# Patient Record
(demographics unavailable — no encounter records)

---

## 2024-10-24 NOTE — DISCUSSION/SUMMARY
[Clean] : was clean [Dry] : was dry [Intact] : was intact [None] : had no drainage [Normal Skin] : normal appearance [Doing Well] : is doing well [Excellent Pain Control] : has excellent pain control [No Sign of Infection] : is showing no signs of infection [1] : 1 [Erythema] : was not erythematous [Ecchymosis] : was not ecchymotic [Seroma] : had no seroma [Firm] : soft [Tender] : nontender [Rebound] : no rebound tenderness [Guarding] : no guarding [Incisional Hernia] : no incisional hernia [Mass] : no palpable mass [Cervical Abnormality] : normal cervix [External Genitalia Abnormal] : normal external genitalia [Vaginal Exam Abnormal] : normal vaginal exam [de-identified] : normal [de-identified] : regular diet. continue to maintain pelvic rest x 4 more weeks. refrain from heavy lifting and strenuous exercise x 4 more weeks. [FreeTextEntry1] : PATHOLOGY:  Peritoneal cytology negative.  1. Endometrium; curettings: - Endometrial polyp.  - Background showing secretory endometrium.  2. Fallopian tube and ovary, left; salpingo-oophorectomy: - Compatible with endometriotic cyst. - Fallopian tube with endometriosis.  3. Fallopian tube and ovary, right; salpingo-oophorectomy: - Compatible with corpus luteum cyst. - Fallopian tube with endometriosis.

## 2024-10-24 NOTE — REASON FOR VISIT
[Post Op] : post op visit [de-identified] : 10/11/24 [de-identified] : robotic BSO/HSC/D&C/polypectomy/extensive RICARDO [de-identified] : She reports that she has had a very smooth recovery with no issues. She denies abnl vaginal bleeding, pelvic or abdominal pain or urinary or bowel complaints. No longer requiring pain medication. Returning to usual activities and maintaining pelvic rest.

## 2024-10-24 NOTE — ASSESSMENT
[FreeTextEntry1] : 43y/o s/p robotic BSO/HSC/D&C/polypectomy, extensive lysis of adhesions, for benign bilateral ovarian endometriomas and endometrial polyp, healing well.  PLAN: Final pathology results were reviewed in detail with the patient and she was given a copy for her records. Instructions were reviewed.  She will f/u with Dr. Herman for continued [facility] cancer followup. She was advised that she should follow up with Dr. Tijerina for menopause management and for routine gynecologic care. She is advised to rpomptly report any PMB and to have sono followup while she is on tamoxifen; sonos can be managed by Dr. Tijerina. She is aware that I remain available to her for any issues or questions.  All questions were answered to her apparent satisfaction.

## 2024-10-28 NOTE — HISTORY OF PRESENT ILLNESS
[Disease: _____________________] : Disease: [unfilled] [T: ___] : T[unfilled] [N: ___] : N[unfilled] [M: ___] : M[unfilled] [AJCC Stage: ____] : AJCC Stage: [unfilled] [de-identified] : The patient presented in 2018, at age 38, with an abnormal screening mammogram, no lump felt but fhx of great maternal aunt had breast cancer.  The patient had her baseline screening mammogram performed on 10/22/2018 which demonstrated pleomorphic calcifications in the right breast. Diagnostic right mammogram and right breast ultrasound were done on 10/23/2018. Mammogram demonstrated calcifications occupying most of the upper inner quadrant of the right breast and also in the nipple. A 5-6 mm suspicious hypoechoic mass was seen at 12:00 on ultrasound.  Stereotactic biopsy of 4 areas the right breast and ultrasound-guided biopsy of the mass were performed on 10/23/2018. The stereotactic biopsies demonstrated atypical ductal hyperplasia. The ultrasound-guided biopsy at 12:00 was positive for moderately differentiated infiltrating ductal carcinoma measuring at least 4 mm. DCIS could not be ruled out in the background. Infiltrating carcinoma was ER positive (90-95%), [place] positive (90-95%), HER-2/toby negative (1+) and Ki-67 less than 10%. Breast MRI on 10/25/2018 revealed enhancement at the 12:00 position in the right breast at the site of known cancer and suspicion for multicentric extensive intraductal carcinoma in the right breast. The left breast and axilla were negative.  Dr. Kesha Vaz performed bilateral mastectomies and bilateral sentinel lymph node biopsies at [facility] on 2018. This was followed by immediate direct to implant reconstruction performed by Dr. Rc Webb.  Pathology from the right breast demonstrated infiltrating ductal carcinoma with micropapillary features, [place] score 6/9 measuring 6 mm. 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 Surgical margins were negative. There were 2 negative sentinel lymph nodes. Pathology from the left breast demonstrated benign fibrocystic changes. There  was one left sentinel lymph node, negative for metastatic disease. The patient had an uneventful postoperative course.    Oncotype DX recurrence score was 15.    Tamoxifen start 2018.  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  Family history is positive for a maternal great aunt who had unilateral breast cancer in her late 60s/early 70s. There is no family history of cancer of the ovary, uterus, or prostate. There is no family history of colorectal neoplasia. The patient's father, who was a smoker,  of lung cancer at age 52. There is no other family history of cancer. The patient has had benign uterine polyps. The patient is of [place] ethnic background. [de-identified] : Infiltrating ductal carcinoma, Page score 6/9, ER positive, [place] positive, HER-2/toby negative, Oncotype DX recurrence score 15 [FreeTextEntry1] : Tamoxifen start 12/2018 [de-identified] : 42/f Infiltrating ductal carcinoma right breast, [place] score 6/9, ER +, [place] +, HER-2/toby negative, Oncotype DX recurrence score 15, T1b N0 M0, Stage IA (AJCC 8 edition), Status post bilateral mastectomies/sentinel lymph node biopsies/direct to implant reconstructions. On tamoxifen since 12/2018. FIRST VISIT WITH ME 12/7/22, TX OF CARE FROM DR COPELAND.  6/24/24 She is tolerating tamoxifen well with no hot flashes. Good compliance. She denies mood changes, wt gain, vaginal dryness, SOB, chest pain, leg swelling or clotting issues. Her energy and appetite is good, wt stable. 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 She underwent IVF. She walks 10 k steps, plays tennis. SHe is doing a rowing machine. Wt stable. DEXA scan 5/14/24: Osteopenia in left femoral neck. Patient is taking Vitamin D 14,000 IU once a week. Planning to take Calcium supplement as well. LMP - 6/2/24., Dr Tijerina , seeing once a year, TVS 1/29/24 which was normal. Rec to use condoms Breast imaging: She gets sono q6m (BL mastectomies). Last US breast in 12/24- BIRADS 2,  f/b Dr Elena Gregory . Requesting to continue sono q6m  Opthal: once a year, Maria Isabel   10/2024  patient underwent BSO for GYN issues.  Path reviewed benign findings.  She is back on tamoxifen.  She is here today to discuss switching to AI.    She has several concerns about premature menopause secondary to  BSO.  We discussed cardiac health and bone health.  She has been followed by primary care for annual lipid profile and had a normal bone density.  We reviewed importance of healthy diet, exercise, weightbearing exercises and calcium intake.  Her concerns regarding premature menopause were addressed to her satisfaction.  Since BSO, she has noted some mood lability but otherwise no severe hot flashes, bone stiffness, fluid retention or weight gain.  She has no vaginal dryness or libido issues yet.  She started tamoxifen  12/2018.  In December 2024 she will finish 6 years.  We discussed 1 to 4 years of AI depending on tolerance.  She had relatively low risk tumor but wants to maximize treatment.  It would be reasonable to finish 10 years of therapy given young age of disease onset as long as she does not suffer from major toxicity. We briefly discussed AI side effects today.  I will see her back in 2 months and start AI.  Will order bone density at next visit.  In the meantime, she will call my office if she develops issues secondary to menopause

## 2024-10-28 NOTE — PHYSICAL EXAM
[Fully active, able to carry on all pre-disease performance without restriction] : Status 0 - Fully active, able to carry on all pre-disease performance without restriction [Normal] : affect appropriate [de-identified] : bilateral mastectomies with silicone implant reconstructions with nipple tattoos bilaterally.  No axillary LAD b/l.  no abnormal masses

## 2024-10-28 NOTE — ASSESSMENT
[FreeTextEntry1] : 42/f Infiltrating ductal carcinoma right breast, [place] score 6/9, ER +, DE +, HER-2/toby negative, Oncotype DX recurrence score 15, T1b N0 M0, Stage IA (AJCC 8 edition), Status post bilateral mastectomies/sentinel lymph node biopsies/direct to implant reconstructions. On tamoxifen since 12/2018. FIRST VISIT WITH ME 12/7/22, TX OF CARE FROM DR COPELAND. CHART REVIEWED  10/2024  patient underwent BSO for GYN issues.  Path reviewed benign findings.  She is back on tamoxifen.  She is here today to discuss switching to AI.    She has several concerns about premature menopause secondary to  BSO.  We discussed cardiac health and bone health.  She has been followed by primary care for annual lipid profile and had a normal bone density.  We reviewed importance of healthy diet, exercise, weightbearing exercises and calcium intake.  Her concerns regarding premature menopause were addressed to her satisfaction.    Since BSO, she has noted some mood lability but otherwise no severe hot flashes, bone stiffness, fluid retention or weight gain.  She has no vaginal dryness or libido issues yet.  She started tamoxifen  12/2018.  In December 2024 she will finish 6 years.  We discussed 1 to 4 years of AI depending on tolerance.  She had relatively low risk tumor but wants to maximize treatment.  It would be reasonable to finish 10 years of therapy given young age of disease onset as long as she does not suffer from major toxicity. We briefly discussed AI side effects today.  I will see her back in 2 months and start AI.  Will order bone density at next visit.  In the meantime, she will call my office if she develops issues secondary to menopause

## 2024-10-28 NOTE — HISTORY OF PRESENT ILLNESS
[Disease: _____________________] : Disease: [unfilled] [T: ___] : T[unfilled] [N: ___] : N[unfilled] [M: ___] : M[unfilled] [AJCC Stage: ____] : AJCC Stage: [unfilled] [de-identified] : The patient presented in 2018, at age 38, with an abnormal screening mammogram, no lump felt but fhx of great maternal aunt had breast cancer.  The patient had her baseline screening mammogram performed on 10/22/2018 which demonstrated pleomorphic calcifications in the right breast. Diagnostic right mammogram and right breast ultrasound were done on 10/23/2018. Mammogram demonstrated calcifications occupying most of the upper inner quadrant of the right breast and also in the nipple. A 5-6 mm suspicious hypoechoic mass was seen at 12:00 on ultrasound.  Stereotactic biopsy of 4 areas the right breast and ultrasound-guided biopsy of the mass were performed on 10/23/2018. The stereotactic biopsies demonstrated atypical ductal hyperplasia. The ultrasound-guided biopsy at 12:00 was positive for moderately differentiated infiltrating ductal carcinoma measuring at least 4 mm. DCIS could not be ruled out in the background. Infiltrating carcinoma was ER positive (90-95%), [place] positive (90-95%), HER-2/toby negative (1+) and Ki-67 less than 10%. Breast MRI on 10/25/2018 revealed enhancement at the 12:00 position in the right breast at the site of known cancer and suspicion for multicentric extensive intraductal carcinoma in the right breast. The left breast and axilla were negative.  Dr. Kesha Vaz performed bilateral mastectomies and bilateral sentinel lymph node biopsies at [facility] on 2018. This was followed by immediate direct to implant reconstruction performed by Dr. Rc Webb.  Pathology from the right breast demonstrated infiltrating ductal carcinoma with micropapillary features, [place] score 6/9 measuring 6 mm. 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 Surgical margins were negative. There were 2 negative sentinel lymph nodes. Pathology from the left breast demonstrated benign fibrocystic changes. There  was one left sentinel lymph node, negative for metastatic disease. The patient had an uneventful postoperative course.    Oncotype DX recurrence score was 15.    Tamoxifen start 2018.  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  Family history is positive for a maternal great aunt who had unilateral breast cancer in her late 60s/early 70s. There is no family history of cancer of the ovary, uterus, or prostate. There is no family history of colorectal neoplasia. The patient's father, who was a smoker,  of lung cancer at age 52. There is no other family history of cancer. The patient has had benign uterine polyps. The patient is of [place] ethnic background. [de-identified] : Infiltrating ductal carcinoma, Page score 6/9, ER positive, [place] positive, HER-2/toby negative, Oncotype DX recurrence score 15 [FreeTextEntry1] : Tamoxifen start 12/2018 [de-identified] : 42/f Infiltrating ductal carcinoma right breast, [place] score 6/9, ER +, [place] +, HER-2/toby negative, Oncotype DX recurrence score 15, T1b N0 M0, Stage IA (AJCC 8 edition), Status post bilateral mastectomies/sentinel lymph node biopsies/direct to implant reconstructions. On tamoxifen since 12/2018. FIRST VISIT WITH ME 12/7/22, TX OF CARE FROM DR COPELAND.  6/24/24 She is tolerating tamoxifen well with no hot flashes. Good compliance. She denies mood changes, wt gain, vaginal dryness, SOB, chest pain, leg swelling or clotting issues. Her energy and appetite is good, wt stable. 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 She underwent IVF. She walks 10 k steps, plays tennis. SHe is doing a rowing machine. Wt stable. DEXA scan 5/14/24: Osteopenia in left femoral neck. Patient is taking Vitamin D 14,000 IU once a week. Planning to take Calcium supplement as well. LMP - 6/2/24., Dr Tijerina , seeing once a year, TVS 1/29/24 which was normal. Rec to use condoms Breast imaging: She gets sono q6m (BL mastectomies). Last US breast in 12/24- BIRADS 2,  f/b Dr Elena Gregory . Requesting to continue sono q6m  Opthal: once a year, Maria Isabel   10/2024  patient underwent BSO for GYN issues.  Path reviewed benign findings.  She is back on tamoxifen.  She is here today to discuss switching to AI.    She has several concerns about premature menopause secondary to  BSO.  We discussed cardiac health and bone health.  She has been followed by primary care for annual lipid profile and had a normal bone density.  We reviewed importance of healthy diet, exercise, weightbearing exercises and calcium intake.  Her concerns regarding premature menopause were addressed to her satisfaction.  Since BSO, she has noted some mood lability but otherwise no severe hot flashes, bone stiffness, fluid retention or weight gain.  She has no vaginal dryness or libido issues yet.  She started tamoxifen  12/2018.  In December 2024 she will finish 6 years.  We discussed 1 to 4 years of AI depending on tolerance.  She had relatively low risk tumor but wants to maximize treatment.  It would be reasonable to finish 10 years of therapy given young age of disease onset as long as she does not suffer from major toxicity. We briefly discussed AI side effects today.  I will see her back in 2 months and start AI.  Will order bone density at next visit.  In the meantime, she will call my office if she develops issues secondary to menopause

## 2024-10-28 NOTE — ASSESSMENT
[FreeTextEntry1] : 42/f Infiltrating ductal carcinoma right breast, [place] score 6/9, ER +, DE +, HER-2/toby negative, Oncotype DX recurrence score 15, T1b N0 M0, Stage IA (AJCC 8 edition), Status post bilateral mastectomies/sentinel lymph node biopsies/direct to implant reconstructions. On tamoxifen since 12/2018. FIRST VISIT WITH ME 12/7/22, TX OF CARE FROM DR COPELAND. CHART REVIEWED  10/2024  patient underwent BSO for GYN issues.  Path reviewed benign findings.  She is back on tamoxifen.  She is here today to discuss switching to AI.    She has several concerns about premature menopause secondary to  BSO.  We discussed cardiac health and bone health.  She has been followed by primary care for annual lipid profile and had a normal bone density.  We reviewed importance of healthy diet, exercise, weightbearing exercises and calcium intake.  Her concerns regarding premature menopause were addressed to her satisfaction.    Since BSO, she has noted some mood lability but otherwise no severe hot flashes, bone stiffness, fluid retention or weight gain.  She has no vaginal dryness or libido issues yet.  She started tamoxifen  12/2018.  In December 2024 she will finish 6 years.  We discussed 1 to 4 years of AI depending on tolerance.  She had relatively low risk tumor but wants to maximize treatment.  It would be reasonable to finish 10 years of therapy given young age of disease onset as long as she does not suffer from major toxicity. We briefly discussed AI side effects today.  I will see her back in 2 months and start AI.  Will order bone density at next visit.  In the meantime, she will call my office if she develops issues secondary to menopause

## 2024-10-28 NOTE — REASON FOR VISIT
[Follow-Up Visit] : a follow-up [Other: _____] : [unfilled] [FreeTextEntry2] : Infiltrating ductal carcinoma right breast

## 2024-10-28 NOTE — PHYSICAL EXAM
Renal US and doppler scheduled 2/8.   [Fully active, able to carry on all pre-disease performance without restriction] : Status 0 - Fully active, able to carry on all pre-disease performance without restriction [Normal] : affect appropriate [de-identified] : bilateral mastectomies with silicone implant reconstructions with nipple tattoos bilaterally.  No axillary LAD b/l.  no abnormal masses

## 2024-12-18 NOTE — PHYSICAL EXAM
[Fully active, able to carry on all pre-disease performance without restriction] : Status 0 - Fully active, able to carry on all pre-disease performance without restriction [Normal] : affect appropriate [de-identified] : bilateral mastectomies with silicone implant reconstructions with nipple tattoos bilaterally.  No axillary LAD b/l.  no abnormal masses

## 2024-12-18 NOTE — PHYSICAL EXAM
[Fully active, able to carry on all pre-disease performance without restriction] : Status 0 - Fully active, able to carry on all pre-disease performance without restriction [Normal] : affect appropriate [de-identified] : bilateral mastectomies with silicone implant reconstructions with nipple tattoos bilaterally.  No axillary LAD b/l.  no abnormal masses

## 2024-12-18 NOTE — HISTORY OF PRESENT ILLNESS
[Disease: _____________________] : Disease: [unfilled] [T: ___] : T[unfilled] [N: ___] : N[unfilled] [M: ___] : M[unfilled] [AJCC Stage: ____] : AJCC Stage: [unfilled] [de-identified] : The patient presented in 2018, at age 38, with an abnormal screening mammogram, no lump felt but fhx of great maternal aunt had breast cancer.  The patient had her baseline screening mammogram performed on 10/22/2018 which demonstrated pleomorphic calcifications in the right breast. Diagnostic right mammogram and right breast ultrasound were done on 10/23/2018. Mammogram demonstrated calcifications occupying most of the upper inner quadrant of the right breast and also in the nipple. A 5-6 mm suspicious hypoechoic mass was seen at 12:00 on ultrasound.  Stereotactic biopsy of 4 areas the right breast and ultrasound-guided biopsy of the mass were performed on 10/23/2018. The stereotactic biopsies demonstrated atypical ductal hyperplasia. The ultrasound-guided biopsy at 12:00 was positive for moderately differentiated infiltrating ductal carcinoma measuring at least 4 mm. DCIS could not be ruled out in the background. Infiltrating carcinoma was ER positive (90-95%), [place] positive (90-95%), HER-2/toby negative (1+) and Ki-67 less than 10%. Breast MRI on 10/25/2018 revealed enhancement at the 12:00 position in the right breast at the site of known cancer and suspicion for multicentric extensive intraductal carcinoma in the right breast. The left breast and axilla were negative.  Dr. Kesha Vaz performed bilateral mastectomies and bilateral sentinel lymph node biopsies at [facility] on 2018. This was followed by immediate direct to implant reconstruction performed by Dr. Rc Webb.  Pathology from the right breast demonstrated infiltrating ductal carcinoma with micropapillary features, [place] score 6/9 measuring 6 mm. 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 Surgical margins were negative. There were 2 negative sentinel lymph nodes. Pathology from the left breast demonstrated benign fibrocystic changes. There  was one left sentinel lymph node, negative for metastatic disease. The patient had an uneventful postoperative course.    Oncotype DX recurrence score was 15.    Tamoxifen start 2018.  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  Family history is positive for a maternal great aunt who had unilateral breast cancer in her late 60s/early 70s. There is no family history of cancer of the ovary, uterus, or prostate. There is no family history of colorectal neoplasia. The patient's father, who was a smoker,  of lung cancer at age 52. There is no other family history of cancer. The patient has had benign uterine polyps. The patient is of [place] ethnic background.  10/2024  patient underwent BSO for GYN issues.  Path reviewed benign findings.  She is back on tamoxifen.  She is here today to discuss switching to AI.    She has several concerns about premature menopause secondary to  BSO.  We discussed cardiac health and bone health.  She has been followed by primary care for annual lipid profile and had a normal bone density.  We reviewed importance of healthy diet, exercise, weightbearing exercises and calcium intake.  Her concerns regarding premature menopause were addressed to her satisfaction.  Since BSO, she has noted some mood lability but otherwise no severe hot flashes, bone stiffness, fluid retention or weight gain.  She has no vaginal dryness or libido issues yet.  She started tamoxifen  2018.  In 2024 she will finish 6 years.  We discussed 1 to 4 years of AI depending on tolerance.  She had relatively low risk tumor but wants to maximize treatment.  It would be reasonable to finish 10 years of therapy given young age of disease onset as long as she does not suffer from major toxicity. We briefly discussed AI side effects today.  I will see her back in 2 months and start AI.  Will order bone density at next visit.  In the meantime, she will call my office if she develops issues secondary to menopause [de-identified] : Infiltrating ductal carcinoma, Page score 6/9, ER positive, [place] positive, HER-2/toby negative, Oncotype DX recurrence score 15 [FreeTextEntry1] : Tamoxifen start 12/2018 [de-identified] : 42/f Infiltrating ductal carcinoma right breast, [place] score 6/9, ER +, [place] +, HER-2/toby negative, Oncotype DX recurrence score 15, T1b N0 M0, Stage IA (AJCC 8 edition), Status post bilateral mastectomies/sentinel lymph node biopsies/direct to implant reconstructions. On tamoxifen since 12/2018. FIRST VISIT WITH ME 12/7/22, TX OF CARE FROM DR COPELAND.  BSO 10/2024.  AI started 11/2024 12/2024 She started anastrozole 11/2024.  Reports mild difficulty sleeping and fatigue.  No joint pain and hot flashes  She plans to start exercise  DEXA 5/2024 osteopenia. She takes ca+ vit D.  Weightbearing exercises discussed Breast imaging: She gets sono q6m (BL mastectomies). Last US breast in 12/24- BIRADS 2,  f/b Dr Elena Gregory . Requesting to continue sono q6m  Cholesterol monitoring and cardiology evaluation due to premature menopause discussed with her.  Referral made to see Dr. Hali Chua She started tamoxifen  12/2018.  In December 2024 she will finish 6 years.  We discussed 1 to 4 years of AI depending on tolerance.  She had relatively low risk tumor but wants to maximize treatment.  It would be reasonable to finish 10 years of therapy given young age of disease onset as long as she does not suffer from major toxicity.

## 2024-12-18 NOTE — REASON FOR VISIT
Addended by: MAURILIO ESPOSITO on: 11/13/2024 12:23 PM     Modules accepted: Orders     [Follow-Up Visit] : a follow-up [Other: _____] : [unfilled] [FreeTextEntry2] : Infiltrating ductal carcinoma right breast

## 2024-12-18 NOTE — ASSESSMENT
[FreeTextEntry1] : 42/f Infiltrating ductal carcinoma right breast, Page score 6/9, ER +, [place] +, HER-2/toby negative, Oncotype DX recurrence score 15, T1b N0 M0, Stage IA (AJCC 8 edition), Status post bilateral mastectomies/sentinel lymph node biopsies/direct to implant reconstructions. On tamoxifen since 12/2018. FIRST VISIT WITH ME 12/7/22, TX OF CARE FROM DR COPELAND. CHART REVIEWED. BSO 10/2024.  AI started 11/2024  1. Breast ca- Ms. DIMITRIS BRIGHT  is tolerating AI well, good compliance. She has mild side effects from the treatment. Continue treatment x 5-10 yrs.  Breast imaging: She gets sono q6m (BL mastectomies). Last US breast in 12/24- BIRADS 2,  f/b Dr Elena Gregory . Requesting to continue sono q6m  She started tamoxifen  12/2018.  In December 2024 she will finish 6 years.  We discussed 1 to 4 years of AI depending on tolerance.  She had relatively low risk tumor but wants to maximize treatment.  It would be reasonable to finish 10 years of therapy given young age of disease onset as long as she does not suffer from major toxicity. 2. Osteopenia: Concern for worsening bone density and fractures due to anastrozole. Rec to  continue calcium and vit D. DEXA 1-2 yrs.  3. High cholesterol/CAD risk factors: Concern for worsening cholesterol/CAD risk factors due to anastrozole. Lipid profile annually. Life style modifications d/w her. Cholesterol monitoring and cardiology evaluation due to premature menopause discussed with her.  Referral made to see Dr. Hali Chua 	 RTC 6 m

## 2024-12-18 NOTE — ASSESSMENT
[FreeTextEntry1] : 42/f Infiltrating ductal carcinoma right breast, Page score 6/9, ER +, [place] +, HER-2/toby negative, Oncotype DX recurrence score 15, T1b N0 M0, Stage IA (AJCC 8 edition), Status post bilateral mastectomies/sentinel lymph node biopsies/direct to implant reconstructions. On tamoxifen since 12/2018. FIRST VISIT WITH ME 12/7/22, TX OF CARE FROM DR COPELAND. CHART REVIEWED. BSO 10/2024.  AI started 11/2024  1. Breast ca- Ms. DIMITRIS BRIGHT  is tolerating AI well, good compliance. She has mild side effects from the treatment. Continue treatment x 5-10 yrs.  Breast imaging: She gets sono q6m (BL mastectomies). Last US breast in 12/24- BIRADS 2,  f/b Dr Elena Gregory . Requesting to continue sono q6m  She started tamoxifen  12/2018.  In December 2024 she will finish 6 years.  We discussed 1 to 4 years of AI depending on tolerance.  She had relatively low risk tumor but wants to maximize treatment.  It would be reasonable to finish 10 years of therapy given young age of disease onset as long as she does not suffer from major toxicity. 2. Osteopenia: Concern for worsening bone density and fractures due to anastrozole. Rec to  continue calcium and vit D. DEXA 1-2 yrs.  3. High cholesterol/CAD risk factors: Concern for worsening cholesterol/CAD risk factors due to anastrozole. Lipid profile annually. Life style modifications d/w her. Cholesterol monitoring and cardiology evaluation due to premature menopause discussed with her.  Referral made to see Dr. Hali Chua 	 RTC 6 m

## 2024-12-18 NOTE — HISTORY OF PRESENT ILLNESS
[Disease: _____________________] : Disease: [unfilled] [T: ___] : T[unfilled] [N: ___] : N[unfilled] [M: ___] : M[unfilled] [AJCC Stage: ____] : AJCC Stage: [unfilled] [de-identified] : The patient presented in 2018, at age 38, with an abnormal screening mammogram, no lump felt but fhx of great maternal aunt had breast cancer.  The patient had her baseline screening mammogram performed on 10/22/2018 which demonstrated pleomorphic calcifications in the right breast. Diagnostic right mammogram and right breast ultrasound were done on 10/23/2018. Mammogram demonstrated calcifications occupying most of the upper inner quadrant of the right breast and also in the nipple. A 5-6 mm suspicious hypoechoic mass was seen at 12:00 on ultrasound.  Stereotactic biopsy of 4 areas the right breast and ultrasound-guided biopsy of the mass were performed on 10/23/2018. The stereotactic biopsies demonstrated atypical ductal hyperplasia. The ultrasound-guided biopsy at 12:00 was positive for moderately differentiated infiltrating ductal carcinoma measuring at least 4 mm. DCIS could not be ruled out in the background. Infiltrating carcinoma was ER positive (90-95%), [place] positive (90-95%), HER-2/toby negative (1+) and Ki-67 less than 10%. Breast MRI on 10/25/2018 revealed enhancement at the 12:00 position in the right breast at the site of known cancer and suspicion for multicentric extensive intraductal carcinoma in the right breast. The left breast and axilla were negative.  Dr. Kesha Vaz performed bilateral mastectomies and bilateral sentinel lymph node biopsies at [facility] on 2018. This was followed by immediate direct to implant reconstruction performed by Dr. Rc Webb.  Pathology from the right breast demonstrated infiltrating ductal carcinoma with micropapillary features, [place] score 6/9 measuring 6 mm. 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 Surgical margins were negative. There were 2 negative sentinel lymph nodes. Pathology from the left breast demonstrated benign fibrocystic changes. There  was one left sentinel lymph node, negative for metastatic disease. The patient had an uneventful postoperative course.    Oncotype DX recurrence score was 15.    Tamoxifen start 2018.  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  Family history is positive for a maternal great aunt who had unilateral breast cancer in her late 60s/early 70s. There is no family history of cancer of the ovary, uterus, or prostate. There is no family history of colorectal neoplasia. The patient's father, who was a smoker,  of lung cancer at age 52. There is no other family history of cancer. The patient has had benign uterine polyps. The patient is of [place] ethnic background.  10/2024  patient underwent BSO for GYN issues.  Path reviewed benign findings.  She is back on tamoxifen.  She is here today to discuss switching to AI.    She has several concerns about premature menopause secondary to  BSO.  We discussed cardiac health and bone health.  She has been followed by primary care for annual lipid profile and had a normal bone density.  We reviewed importance of healthy diet, exercise, weightbearing exercises and calcium intake.  Her concerns regarding premature menopause were addressed to her satisfaction.  Since BSO, she has noted some mood lability but otherwise no severe hot flashes, bone stiffness, fluid retention or weight gain.  She has no vaginal dryness or libido issues yet.  She started tamoxifen  2018.  In 2024 she will finish 6 years.  We discussed 1 to 4 years of AI depending on tolerance.  She had relatively low risk tumor but wants to maximize treatment.  It would be reasonable to finish 10 years of therapy given young age of disease onset as long as she does not suffer from major toxicity. We briefly discussed AI side effects today.  I will see her back in 2 months and start AI.  Will order bone density at next visit.  In the meantime, she will call my office if she develops issues secondary to menopause [de-identified] : Infiltrating ductal carcinoma, Page score 6/9, ER positive, [place] positive, HER-2/toby negative, Oncotype DX recurrence score 15 [FreeTextEntry1] : Tamoxifen start 12/2018 [de-identified] : 42/f Infiltrating ductal carcinoma right breast, [place] score 6/9, ER +, [place] +, HER-2/toby negative, Oncotype DX recurrence score 15, T1b N0 M0, Stage IA (AJCC 8 edition), Status post bilateral mastectomies/sentinel lymph node biopsies/direct to implant reconstructions. On tamoxifen since 12/2018. FIRST VISIT WITH ME 12/7/22, TX OF CARE FROM DR COPELAND.  BSO 10/2024.  AI started 11/2024 12/2024 She started anastrozole 11/2024.  Reports mild difficulty sleeping and fatigue.  No joint pain and hot flashes  She plans to start exercise  DEXA 5/2024 osteopenia. She takes ca+ vit D.  Weightbearing exercises discussed Breast imaging: She gets sono q6m (BL mastectomies). Last US breast in 12/24- BIRADS 2,  f/b Dr Elena Gregory . Requesting to continue sono q6m  Cholesterol monitoring and cardiology evaluation due to premature menopause discussed with her.  Referral made to see Dr. Hali Chua She started tamoxifen  12/2018.  In December 2024 she will finish 6 years.  We discussed 1 to 4 years of AI depending on tolerance.  She had relatively low risk tumor but wants to maximize treatment.  It would be reasonable to finish 10 years of therapy given young age of disease onset as long as she does not suffer from major toxicity.

## 2025-06-09 NOTE — ASSESSMENT
[FreeTextEntry1] : 42/f Infiltrating ductal carcinoma right breast, Page score 6/9, ER +, [place] +, HER-2/toby negative, Oncotype DX recurrence score 15, T1b N0 M0, Stage IA (AJCC 8 edition), Status post bilateral mastectomies/sentinel lymph node biopsies/direct to implant reconstructions. On tamoxifen since 12/2018. FIRST VISIT WITH ME 12/7/22, TX OF CARE FROM DR COPELAND. CHART REVIEWED. BSO 10/2024.  AI started 11/2024  1. Breast ca- Ms. DIMITRIS BRIGHT  is tolerating AI well, good compliance. She has mild side effects from the treatment. Continue treatment x 5-10 yrs.  Breast imaging: She gets sono q6m (BL mastectomies). Last US breast in 12/24- BIRADS 2,  f/b Dr Elena Gregory . Requesting to continue sono q6m  She started tamoxifen  12/2018.  In December 2024 she will finish 6 years.  We discussed 1 to 4 years of AI depending on tolerance.  She had relatively low risk tumor but wants to maximize treatment.  It would be reasonable to finish 10 years of therapy given young age of disease onset as long as she does not suffer from major toxicity. She s highly motivated to do extended therapy  2. Osteopenia: Concern for worsening bone density and fractures due to anastrozole. Rec to  continue calcium and vit D. DEXA 1-2 yrs.  3. High cholesterol/CAD risk factors: Concern for worsening cholesterol/CAD risk factors due to anastrozole. Lipid profile annually. Life style modifications d/w her. Cholesterol monitoring and cardiology evaluation due to premature menopause discussed with her.  Referral made to see Dr. Hali Chua 	 RTC 6 m

## 2025-06-09 NOTE — HISTORY OF PRESENT ILLNESS
[Disease: _____________________] : Disease: [unfilled] [T: ___] : T[unfilled] [N: ___] : N[unfilled] [M: ___] : M[unfilled] [AJCC Stage: ____] : AJCC Stage: [unfilled] [de-identified] : The patient presented in 2018, at age 38, with an abnormal screening mammogram, no lump felt but fhx of great maternal aunt had breast cancer.  The patient had her baseline screening mammogram performed on 10/22/2018 which demonstrated pleomorphic calcifications in the right breast. Diagnostic right mammogram and right breast ultrasound were done on 10/23/2018. Mammogram demonstrated calcifications occupying most of the upper inner quadrant of the right breast and also in the nipple. A 5-6 mm suspicious hypoechoic mass was seen at 12:00 on ultrasound.  Stereotactic biopsy of 4 areas the right breast and ultrasound-guided biopsy of the mass were performed on 10/23/2018. The stereotactic biopsies demonstrated atypical ductal hyperplasia. The ultrasound-guided biopsy at 12:00 was positive for moderately differentiated infiltrating ductal carcinoma measuring at least 4 mm. DCIS could not be ruled out in the background. Infiltrating carcinoma was ER positive (90-95%), [place] positive (90-95%), HER-2/toby negative (1+) and Ki-67 less than 10%. Breast MRI on 10/25/2018 revealed enhancement at the 12:00 position in the right breast at the site of known cancer and suspicion for multicentric extensive intraductal carcinoma in the right breast. The left breast and axilla were negative.  Dr. Kesha Vaz performed bilateral mastectomies and bilateral sentinel lymph node biopsies at [facility] on 2018. This was followed by immediate direct to implant reconstruction performed by Dr. Rc Webb.  Pathology from the right breast demonstrated infiltrating ductal carcinoma with micropapillary features, [place] score 6/9 measuring 6 mm. 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 Surgical margins were negative. There were 2 negative sentinel lymph nodes. Pathology from the left breast demonstrated benign fibrocystic changes. There  was one left sentinel lymph node, negative for metastatic disease. The patient had an uneventful postoperative course.    Oncotype DX recurrence score was 15.    Tamoxifen start 2018.  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  Family history is positive for a maternal great aunt who had unilateral breast cancer in her late 60s/early 70s. There is no family history of cancer of the ovary, uterus, or prostate. There is no family history of colorectal neoplasia. The patient's father, who was a smoker,  of lung cancer at age 52. There is no other family history of cancer. The patient has had benign uterine polyps. The patient is of [place] ethnic background.  10/2024  patient underwent BSO for GYN issues.  Path reviewed benign findings.  She is back on tamoxifen.  She is here today to discuss switching to AI.    She has several concerns about premature menopause secondary to  BSO.  We discussed cardiac health and bone health.  She has been followed by primary care for annual lipid profile and had a normal bone density.  We reviewed importance of healthy diet, exercise, weightbearing exercises and calcium intake.  Her concerns regarding premature menopause were addressed to her satisfaction.  Since BSO, she has noted some mood lability but otherwise no severe hot flashes, bone stiffness, fluid retention or weight gain.  She has no vaginal dryness or libido issues yet.  She started tamoxifen  2018.  In 2024 she will finish 6 years.  We discussed 1 to 4 years of AI depending on tolerance.  She had relatively low risk tumor but wants to maximize treatment.  It would be reasonable to finish 10 years of therapy given young age of disease onset as long as she does not suffer from major toxicity. We briefly discussed AI side effects today.  I will see her back in 2 months and start AI.  Will order bone density at next visit.  In the meantime, she will call my office if she develops issues secondary to menopause [de-identified] : Infiltrating ductal carcinoma, Page score 6/9, ER positive, [place] positive, HER-2/toby negative, Oncotype DX recurrence score 15 [de-identified] : 42/f Infiltrating ductal carcinoma right breast, [place] score 6/9, ER +, [place] +, HER-2/toby negative, Oncotype DX recurrence score 15, T1b N0 M0, Stage IA (AJCC 8 edition), Status post bilateral mastectomies/sentinel lymph node biopsies/direct to implant reconstructions. On tamoxifen since 12/2018. FIRST VISIT WITH ME 12/7/22, TX OF CARE FROM DR COPELAND.  BSO 10/2024.  AI started 11/2024 12/2024 She started anastrozole 11/2024.  Reports mild difficulty sleeping and fatigue.  No joint pain and hot flashes  She plans to start exercise  DEXA 5/2024 osteopenia. She takes ca+ vit D.  Weightbearing exercises discussed Breast imaging: She gets sono q6m (BL mastectomies). Last US breast in 12/24- BIRADS 2,  f/b Dr Elena Gregory . Requesting to continue sono q6m  Cholesterol monitoring and cardiology evaluation due to premature menopause discussed with her.  Referral made to see Dr. Hali Chua She started tamoxifen  12/2018.  In December 2024 she will finish 6 years.  We discussed 1 to 4 years of AI depending on tolerance.  She had relatively low risk tumor but wants to maximize treatment.  It would be reasonable to finish 10 years of therapy given young age of disease onset as long as she does not suffer from major toxicity.  6/2025 Patient continues to take Anastrozole with good daily efficacy. Patient denies any excessive side effects consistent with: arthralgias, hot flashes, vaginal dryness, hair thinning, GI s/e's, SOB, excessive fatigue and sleep or mood disturbances. Anastrozole renewed today to pharmacy on file. Breast Health: She wants to be f/b 6 m US. US 5/2025 BIRADS 2. Will order for 12/2025  Bone Health: Last DEXA Bone Density 5/2024 osteopenia She is doing wt bearing exercise Chol is borderline high- Will recheck  She is seeing GYN once a year and getting sono ( h/o duncan use)  RTC: 6 Months  [FreeTextEntry1] : Tamoxifen start 12/2018

## 2025-06-09 NOTE — PHYSICAL EXAM
[Fully active, able to carry on all pre-disease performance without restriction] : Status 0 - Fully active, able to carry on all pre-disease performance without restriction [Normal] : affect appropriate [de-identified] : bilateral mastectomies with silicone implant reconstructions with nipple tattoos bilaterally.  No axillary LAD b/l.  no abnormal masses